# Patient Record
Sex: FEMALE | Race: OTHER | NOT HISPANIC OR LATINO
[De-identification: names, ages, dates, MRNs, and addresses within clinical notes are randomized per-mention and may not be internally consistent; named-entity substitution may affect disease eponyms.]

---

## 2019-01-24 PROBLEM — Z00.00 ENCOUNTER FOR PREVENTIVE HEALTH EXAMINATION: Status: ACTIVE | Noted: 2019-01-24

## 2019-01-30 ENCOUNTER — APPOINTMENT (OUTPATIENT)
Dept: GASTROENTEROLOGY | Facility: CLINIC | Age: 59
End: 2019-01-30
Payer: COMMERCIAL

## 2019-01-30 VITALS
RESPIRATION RATE: 16 BRPM | HEART RATE: 69 BPM | BODY MASS INDEX: 31.54 KG/M2 | SYSTOLIC BLOOD PRESSURE: 160 MMHG | HEIGHT: 63 IN | DIASTOLIC BLOOD PRESSURE: 97 MMHG | WEIGHT: 178 LBS

## 2019-01-30 DIAGNOSIS — K62.5 HEMORRHAGE OF ANUS AND RECTUM: ICD-10-CM

## 2019-01-30 PROCEDURE — 99204 OFFICE O/P NEW MOD 45 MIN: CPT

## 2019-01-30 PROCEDURE — 82272 OCCULT BLD FECES 1-3 TESTS: CPT

## 2019-01-30 NOTE — PHYSICAL EXAM
[General Appearance - Alert] : alert [General Appearance - In No Acute Distress] : in no acute distress [General Appearance - Well Nourished] : well nourished [General Appearance - Well Developed] : well developed [Sclera] : the sclera and conjunctiva were normal [Outer Ear] : the ears and nose were normal in appearance [Neck Appearance] : the appearance of the neck was normal [] : no respiratory distress [Respiration, Rhythm And Depth] : normal respiratory rhythm and effort [Exaggerated Use Of Accessory Muscles For Inspiration] : no accessory muscle use [Auscultation Breath Sounds / Voice Sounds] : lungs were clear to auscultation bilaterally [Apical Impulse] : the apical impulse was normal [Heart Rate And Rhythm] : heart rate was normal and rhythm regular [Heart Sounds] : normal S1 and S2 [Bowel Sounds] : normal bowel sounds [Abdomen Soft] : soft [Abdomen Tenderness] : non-tender [Normal Sphincter Tone] : normal sphincter tone [No Rectal Mass] : no rectal mass [Internal Hemorrhoid] : no internal hemorrhoids [External Hemorrhoid] : external hemorrhoids [Occult Blood Positive] : stool was negative for occult blood [FreeTextEntry1] : small  hemorrhoid [Skin Color & Pigmentation] : normal skin color and pigmentation [Oriented To Time, Place, And Person] : oriented to person, place, and time [Impaired Insight] : insight and judgment were intact [Affect] : the affect was normal

## 2019-01-30 NOTE — HISTORY OF PRESENT ILLNESS
[de-identified] : The patient is here for evaluation of rectal bleeding. The patient states the rectal bleeding is mild and only occurs with wiping. It occurs with bowel movement. She must place a  Tissue at the anus to stop the bleeding.  Bleeding lasts about 20 minutes. Last time this happened was 3 weeks ago. She has mild constipation for the past 10 years. It occurs every day. She is one hard stool a day. It is better with prune juice. She colonoscopy in 2011 which showed internal hemorrhoids. I reviewed the results

## 2019-01-30 NOTE — ASSESSMENT
[FreeTextEntry1] : A/P\par  -Pt with rectal bleeding, mild constipation, hemorrhoids\par - hemorrhoids- preparation  H prn\par -rectal  bleeding-I discussed the risks and benefits of colonoscopy and patient was given the opportunity to ask questions. Colonoscopy is being done to rule out colon cancer, polyps or AVMs. Patient is medically optimized for the procedure. Miralax prep\par - will get cbc,. cmp from primary MD

## 2019-03-20 ENCOUNTER — OUTPATIENT (OUTPATIENT)
Dept: OUTPATIENT SERVICES | Facility: HOSPITAL | Age: 59
LOS: 1 days | End: 2019-03-20
Payer: COMMERCIAL

## 2019-03-20 ENCOUNTER — APPOINTMENT (OUTPATIENT)
Dept: GASTROENTEROLOGY | Facility: GI CENTER | Age: 59
End: 2019-03-20
Payer: COMMERCIAL

## 2019-03-20 DIAGNOSIS — K62.5 HEMORRHAGE OF ANUS AND RECTUM: ICD-10-CM

## 2019-03-20 PROCEDURE — 45378 DIAGNOSTIC COLONOSCOPY: CPT

## 2019-03-20 RX ORDER — POLYETHYLENE GLYCOL 3350 17 G/17G
17 POWDER, FOR SOLUTION ORAL DAILY
Qty: 1530 | Refills: 1 | Status: ACTIVE | COMMUNITY
Start: 2019-03-20 | End: 1900-01-01

## 2019-03-20 RX ORDER — HYDROCORTISONE 2.5% 25 MG/G
2.5 CREAM TOPICAL TWICE DAILY
Qty: 1 | Refills: 5 | Status: ACTIVE | COMMUNITY
Start: 2019-03-20 | End: 1900-01-01

## 2019-03-20 NOTE — PHYSICAL EXAM
[General Appearance - Alert] : alert [General Appearance - In No Acute Distress] : in no acute distress [General Appearance - Well Nourished] : well nourished [Sclera] : the sclera and conjunctiva were normal [Outer Ear] : the ears and nose were normal in appearance [Neck Appearance] : the appearance of the neck was normal [Respiration, Rhythm And Depth] : normal respiratory rhythm and effort [Exaggerated Use Of Accessory Muscles For Inspiration] : no accessory muscle use [Auscultation Breath Sounds / Voice Sounds] : lungs were clear to auscultation bilaterally [Apical Impulse] : the apical impulse was normal [Heart Rate And Rhythm] : heart rate was normal and rhythm regular [Heart Sounds] : normal S1 and S2 [Bowel Sounds] : normal bowel sounds [Abdomen Soft] : soft [Abdomen Tenderness] : non-tender [Skin Color & Pigmentation] : normal skin color and pigmentation [Skin Turgor] : normal skin turgor [] : no rash [Oriented To Time, Place, And Person] : oriented to person, place, and time [Impaired Insight] : insight and judgment were intact [Affect] : the affect was normal

## 2019-03-20 NOTE — ASSESSMENT
[FreeTextEntry1] : A/P\par rectal bleeding, constipation\par colon showed hemorrhoids\par proctosol HC bid\par miralax 17 qd\par colonoscopy in 10 years\par F/u in 3 months

## 2019-03-20 NOTE — PROCEDURE
[Procedure Explained] : The procedure was explained [Allergies Reviewed] : allergies reviewed. [Risks] : Risks [Benefits] : benefits [Alternatives] : alternatives [Bleeding] : bleeding risk [Infection] : risk of infection [Consent Obtained] : written consent was obtained prior to the procedure and is detailed in the patient's record [Bowel Prep Kit] : the patient took the appropriate bowel preparation kit as directed [Patient] : the patient [Approved Diet Followed] : the patient avoided solid foods and adhered to the approved diet list for 24 hours prior to the procedure [Cardiac Monitor] : cardiac monitor [Automated Blood Pressure Cuff] : automated blood pressure cuff [Pulse Oximeter] : pulse oximeter [Sedation Clearance] : the patient was cleared for moderate sedation [Prep Qualtiy: ___] : Prep Quality:  [unfilled] [Withdrawal Time: ___] : Withdrawal Time:  [unfilled] [Performed By: ___] : Performed by:  BRIGITTE [Abnormal Rectum] : a normal rectum [Left Lateral Decubitus] : The patient was positioned in the left lateral decubitus position [External Hemorrhoids] : no external hemorrhoids [Cecum (Landmarks/Transillum)] : and guided to the cecum which was identified by the anatomic landmarks of the appendiceal orifice and ileocecal valve and by transillumination in the right lower quadrant [Insufflated] : insufflated [Single Pass Needed] : after a single pass [Retroflex View] : a retroflex view of the rectum was performed [Patient Rotated Into Alternating Positions] : the patient was not rotated [Hemorrhoids] : hemorrhoids [Normal] : Normal [No Complications] : There were no complications [Vital Signs Stable] : the vital signs were stable [Tolerated Well] : the patient tolerated the procedure well [de-identified] : rectal bleeding, constipation

## 2019-03-20 NOTE — REASON FOR VISIT
[Procedure: _________] : a [unfilled] procedure visit [FreeTextEntry1] : rectal bleeding, constipation [FreeTextEntry2] : rectal bleeding, constipation [Colonoscopy] : a colonoscopy

## 2019-03-20 NOTE — PROCEDURE
[Allergies Reviewed] : allergies reviewed. [Procedure Explained] : The procedure was explained [Risks] : Risks [Alternatives] : alternatives [Benefits] : benefits [Bleeding] : bleeding risk [Consent Obtained] : written consent was obtained prior to the procedure and is detailed in the patient's record [Infection] : risk of infection [Patient] : the patient [Bowel Prep Kit] : the patient took the appropriate bowel preparation kit as directed [Approved Diet Followed] : the patient avoided solid foods and adhered to the approved diet list for 24 hours prior to the procedure [Automated Blood Pressure Cuff] : automated blood pressure cuff [Cardiac Monitor] : cardiac monitor [Pulse Oximeter] : pulse oximeter [Sedation Clearance] : the patient was cleared for moderate sedation [Withdrawal Time: ___] : Withdrawal Time:  [unfilled] [Prep Qualtiy: ___] : Prep Quality:  [unfilled] [Performed By: ___] : Performed by:  BRIGITTE [Abnormal Rectum] : a normal rectum [Left Lateral Decubitus] : The patient was positioned in the left lateral decubitus position [Cecum (Landmarks/Transillum)] : and guided to the cecum which was identified by the anatomic landmarks of the appendiceal orifice and ileocecal valve and by transillumination in the right lower quadrant [External Hemorrhoids] : no external hemorrhoids [Single Pass Needed] : after a single pass [Insufflated] : insufflated [Retroflex View] : a retroflex view of the rectum was performed [Patient Rotated Into Alternating Positions] : the patient was not rotated [Normal] : Normal [Hemorrhoids] : hemorrhoids [Tolerated Well] : the patient tolerated the procedure well [No Complications] : There were no complications [Vital Signs Stable] : the vital signs were stable [de-identified] : rectal bleeding, constipation

## 2020-04-03 ENCOUNTER — EMERGENCY (EMERGENCY)
Facility: HOSPITAL | Age: 60
LOS: 1 days | Discharge: HOME | End: 2020-04-03
Attending: EMERGENCY MEDICINE
Payer: COMMERCIAL

## 2020-04-03 VITALS
TEMPERATURE: 99 F | DIASTOLIC BLOOD PRESSURE: 90 MMHG | SYSTOLIC BLOOD PRESSURE: 147 MMHG | RESPIRATION RATE: 18 BRPM | WEIGHT: 169.98 LBS | HEIGHT: 63 IN | OXYGEN SATURATION: 97 % | HEART RATE: 99 BPM

## 2020-04-03 LAB
ALBUMIN SERPL ELPH-MCNC: 4.3 G/DL — SIGNIFICANT CHANGE UP (ref 3.3–5.2)
ALP SERPL-CCNC: 80 U/L — SIGNIFICANT CHANGE UP (ref 40–120)
ALT FLD-CCNC: 19 U/L — SIGNIFICANT CHANGE UP
ANION GAP SERPL CALC-SCNC: 14 MMOL/L — SIGNIFICANT CHANGE UP (ref 5–17)
APPEARANCE UR: CLEAR — SIGNIFICANT CHANGE UP
AST SERPL-CCNC: 35 U/L — HIGH
BACTERIA # UR AUTO: NEGATIVE — SIGNIFICANT CHANGE UP
BASOPHILS # BLD AUTO: 0.01 K/UL — SIGNIFICANT CHANGE UP (ref 0–0.2)
BASOPHILS NFR BLD AUTO: 0.2 % — SIGNIFICANT CHANGE UP (ref 0–2)
BILIRUB SERPL-MCNC: 0.3 MG/DL — LOW (ref 0.4–2)
BILIRUB UR-MCNC: NEGATIVE — SIGNIFICANT CHANGE UP
BUN SERPL-MCNC: 14 MG/DL — SIGNIFICANT CHANGE UP (ref 8–20)
CALCIUM SERPL-MCNC: 9.6 MG/DL — SIGNIFICANT CHANGE UP (ref 8.6–10.2)
CHLORIDE SERPL-SCNC: 98 MMOL/L — SIGNIFICANT CHANGE UP (ref 98–107)
CO2 SERPL-SCNC: 26 MMOL/L — SIGNIFICANT CHANGE UP (ref 22–29)
COLOR SPEC: YELLOW — SIGNIFICANT CHANGE UP
CREAT SERPL-MCNC: 0.74 MG/DL — SIGNIFICANT CHANGE UP (ref 0.5–1.3)
DIFF PNL FLD: ABNORMAL
EOSINOPHIL # BLD AUTO: 0 K/UL — SIGNIFICANT CHANGE UP (ref 0–0.5)
EOSINOPHIL NFR BLD AUTO: 0 % — SIGNIFICANT CHANGE UP (ref 0–6)
EPI CELLS # UR: SIGNIFICANT CHANGE UP
GLUCOSE SERPL-MCNC: 114 MG/DL — HIGH (ref 70–99)
GLUCOSE UR QL: NEGATIVE MG/DL — SIGNIFICANT CHANGE UP
HCT VFR BLD CALC: 38.8 % — SIGNIFICANT CHANGE UP (ref 34.5–45)
HGB BLD-MCNC: 13.1 G/DL — SIGNIFICANT CHANGE UP (ref 11.5–15.5)
IMM GRANULOCYTES NFR BLD AUTO: 0.2 % — SIGNIFICANT CHANGE UP (ref 0–1.5)
KETONES UR-MCNC: NEGATIVE — SIGNIFICANT CHANGE UP
LEUKOCYTE ESTERASE UR-ACNC: NEGATIVE — SIGNIFICANT CHANGE UP
LIDOCAIN IGE QN: 65 U/L — HIGH (ref 22–51)
LYMPHOCYTES # BLD AUTO: 1.09 K/UL — SIGNIFICANT CHANGE UP (ref 1–3.3)
LYMPHOCYTES # BLD AUTO: 24.8 % — SIGNIFICANT CHANGE UP (ref 13–44)
MCHC RBC-ENTMCNC: 29.8 PG — SIGNIFICANT CHANGE UP (ref 27–34)
MCHC RBC-ENTMCNC: 33.8 GM/DL — SIGNIFICANT CHANGE UP (ref 32–36)
MCV RBC AUTO: 88.4 FL — SIGNIFICANT CHANGE UP (ref 80–100)
MONOCYTES # BLD AUTO: 0.6 K/UL — SIGNIFICANT CHANGE UP (ref 0–0.9)
MONOCYTES NFR BLD AUTO: 13.6 % — SIGNIFICANT CHANGE UP (ref 2–14)
NEUTROPHILS # BLD AUTO: 2.69 K/UL — SIGNIFICANT CHANGE UP (ref 1.8–7.4)
NEUTROPHILS NFR BLD AUTO: 61.2 % — SIGNIFICANT CHANGE UP (ref 43–77)
NITRITE UR-MCNC: NEGATIVE — SIGNIFICANT CHANGE UP
PH UR: 6 — SIGNIFICANT CHANGE UP (ref 5–8)
PLATELET # BLD AUTO: 222 K/UL — SIGNIFICANT CHANGE UP (ref 150–400)
POTASSIUM SERPL-MCNC: 5 MMOL/L — SIGNIFICANT CHANGE UP (ref 3.5–5.3)
POTASSIUM SERPL-SCNC: 5 MMOL/L — SIGNIFICANT CHANGE UP (ref 3.5–5.3)
PROT SERPL-MCNC: 8.1 G/DL — SIGNIFICANT CHANGE UP (ref 6.6–8.7)
PROT UR-MCNC: 30 MG/DL
RBC # BLD: 4.39 M/UL — SIGNIFICANT CHANGE UP (ref 3.8–5.2)
RBC # FLD: 13 % — SIGNIFICANT CHANGE UP (ref 10.3–14.5)
RBC CASTS # UR COMP ASSIST: SIGNIFICANT CHANGE UP /HPF (ref 0–4)
SODIUM SERPL-SCNC: 138 MMOL/L — SIGNIFICANT CHANGE UP (ref 135–145)
SP GR SPEC: 1.01 — SIGNIFICANT CHANGE UP (ref 1.01–1.02)
UROBILINOGEN FLD QL: NEGATIVE MG/DL — SIGNIFICANT CHANGE UP
WBC # BLD: 4.4 K/UL — SIGNIFICANT CHANGE UP (ref 3.8–10.5)
WBC # FLD AUTO: 4.4 K/UL — SIGNIFICANT CHANGE UP (ref 3.8–10.5)
WBC UR QL: NEGATIVE — SIGNIFICANT CHANGE UP

## 2020-04-03 PROCEDURE — 85027 COMPLETE CBC AUTOMATED: CPT

## 2020-04-03 PROCEDURE — 99284 EMERGENCY DEPT VISIT MOD MDM: CPT

## 2020-04-03 PROCEDURE — 87635 SARS-COV-2 COVID-19 AMP PRB: CPT

## 2020-04-03 PROCEDURE — 81001 URINALYSIS AUTO W/SCOPE: CPT

## 2020-04-03 PROCEDURE — 36415 COLL VENOUS BLD VENIPUNCTURE: CPT

## 2020-04-03 PROCEDURE — 96374 THER/PROPH/DIAG INJ IV PUSH: CPT

## 2020-04-03 PROCEDURE — 99284 EMERGENCY DEPT VISIT MOD MDM: CPT | Mod: 25

## 2020-04-03 PROCEDURE — 83690 ASSAY OF LIPASE: CPT

## 2020-04-03 PROCEDURE — 80053 COMPREHEN METABOLIC PANEL: CPT

## 2020-04-03 RX ORDER — SODIUM CHLORIDE 9 MG/ML
1000 INJECTION INTRAMUSCULAR; INTRAVENOUS; SUBCUTANEOUS ONCE
Refills: 0 | Status: COMPLETED | OUTPATIENT
Start: 2020-04-03 | End: 2020-04-03

## 2020-04-03 RX ORDER — FAMOTIDINE 10 MG/ML
20 INJECTION INTRAVENOUS ONCE
Refills: 0 | Status: COMPLETED | OUTPATIENT
Start: 2020-04-03 | End: 2020-04-03

## 2020-04-03 RX ORDER — SUCRALFATE 1 G
1 TABLET ORAL ONCE
Refills: 0 | Status: COMPLETED | OUTPATIENT
Start: 2020-04-03 | End: 2020-04-03

## 2020-04-03 RX ADMIN — SODIUM CHLORIDE 2000 MILLILITER(S): 9 INJECTION INTRAMUSCULAR; INTRAVENOUS; SUBCUTANEOUS at 14:27

## 2020-04-03 RX ADMIN — Medication 1 GRAM(S): at 14:26

## 2020-04-03 RX ADMIN — FAMOTIDINE 20 MILLIGRAM(S): 10 INJECTION INTRAVENOUS at 14:27

## 2020-04-03 NOTE — ED STATDOCS - PATIENT PORTAL LINK FT
You can access the FollowMyHealth Patient Portal offered by [facility] by registering at the following website: [email or web address]. By joining [employer]’s FollowMyHealth portal, you will also be able to view your health information using other applications (apps) compatible with our system.

## 2020-04-03 NOTE — ED STATDOCS - CLINICAL SUMMARY MEDICAL DECISION MAKING FREE TEXT BOX
abd pain with epigastric tenderness and rales in left lower lobe. Possible corona. check labs, Pepcid, Carafate and reassess.

## 2020-04-03 NOTE — ED STATDOCS - PROGRESS NOTE DETAILS
Kirit MELGOZA: Labs reviewed and discussed with pt.  On reevaluation, pt remains in no acute distress, abdomen soft and non-tender.  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  Pt advised to self-isolate until symptoms resolve.  Return to the ED for new or worsening symptoms.

## 2020-04-03 NOTE — ED STATDOCS - NSFOLLOWUPINSTRUCTIONS_ED_ALL_ED_FT
Take Tylenol as needed for symptoms.  Stay at home for 2 weeks, until your symptoms have completely resolved.  Stay hydrated.  Return to the emergency room for worsening symptoms, including increased difficulty breathing or chest pain.

## 2020-04-03 NOTE — ED STATDOCS - NS_ ATTENDINGSCRIBEDETAILS _ED_A_ED_FT
I, Rick Randolph, performed the initial face to face bedside interview with this patient regarding history of present illness, review of symptoms and relevant past medical, social and family history.  I completed an independent physical examination.  I was the provider who initially evaluated this patient.  The history, relevant review of systems, past medical and surgical history, medical decision making, and physical examination was documented by the scribe in my presence and I attest to the accuracy of the documentation. Follow-up on ordered tests (ie labs, radiologic studies) and re-evaluation of the patient's status has been communicated to the resident.  Disposition of the patient will be based on test outcome and response to ED interventions.

## 2020-04-03 NOTE — ED ADULT TRIAGE NOTE - CHIEF COMPLAINT QUOTE
pt c/o "Buzz shaky with my nerves", decreased po intake and abd pain, nausea and diarrhea, resp even and unlabored no distress noted, pt denies sob, chest pain vomiting

## 2020-04-03 NOTE — ED STATDOCS - OBJECTIVE STATEMENT
60y/o F with PMHx of Sickle Cell Anemia presents to the ED c/o abdominal pain which began a few weeks ago with assoc. sx of loss of appetite and nausea. Additionally c/o diarrhea which began today and worsening dizziness for 1 week. Pt had prior cough, since resolved. She additionally reports fever 1 week ago, resolved. Pt had prior episode of stomach growling sensation with increased gas. No known sick/covid contact. Denies loss of taste or smell, SOB or vomiting. 58y/o F with PMHx of Sickle Cell Anemia presents to the ED c/o abdominal pain which began a few weeks ago with assoc. sx of loss of appetite and nausea. Additionally c/o diarrhea which began today and worsening dizziness for 1 week. Pt had prior cough, since resolved. She additionally reports fever 1 week ago, resolved. Pt had prior episode of stomach growling sensation with increased gas. No known sick/covid contact. Denies loss of taste or smell, SOB or vomiting. H/O prior dyspepsia.  healthcare worker.

## 2020-04-03 NOTE — ED STATDOCS - PMH
No pertinent past medical history <<----- Click to add NO pertinent Past Medical History Sickle cell trait

## 2020-04-05 LAB — SARS-COV-2 RNA SPEC QL NAA+PROBE: (no result)

## 2020-05-01 ENCOUNTER — TRANSCRIPTION ENCOUNTER (OUTPATIENT)
Age: 60
End: 2020-05-01

## 2024-09-17 ENCOUNTER — RX ONLY (RX ONLY)
Age: 64
End: 2024-09-17

## 2024-09-17 ENCOUNTER — OFFICE (OUTPATIENT)
Dept: URBAN - METROPOLITAN AREA CLINIC 12 | Facility: CLINIC | Age: 64
Setting detail: OPHTHALMOLOGY
End: 2024-09-17
Payer: COMMERCIAL

## 2024-09-17 DIAGNOSIS — H00.14: ICD-10-CM

## 2024-09-17 PROCEDURE — 99203 OFFICE O/P NEW LOW 30 MIN: CPT | Performed by: OPTOMETRIST

## 2024-09-17 ASSESSMENT — CONFRONTATIONAL VISUAL FIELD TEST (CVF)
OD_FINDINGS: FULL
OS_FINDINGS: FULL

## 2024-10-02 ENCOUNTER — OFFICE (OUTPATIENT)
Dept: URBAN - METROPOLITAN AREA CLINIC 12 | Facility: CLINIC | Age: 64
Setting detail: OPHTHALMOLOGY
End: 2024-10-02
Payer: COMMERCIAL

## 2024-10-02 DIAGNOSIS — H16.223: ICD-10-CM

## 2024-10-02 DIAGNOSIS — H90.3: ICD-10-CM

## 2024-10-02 DIAGNOSIS — H11.153: ICD-10-CM

## 2024-10-02 PROBLEM — H00.14 CHALAZION; LEFT UPPER LID: Status: RESOLVED | Noted: 2024-09-17 | Resolved: 2024-10-02

## 2024-10-02 PROCEDURE — 92012 INTRM OPH EXAM EST PATIENT: CPT | Performed by: STUDENT IN AN ORGANIZED HEALTH CARE EDUCATION/TRAINING PROGRAM

## 2024-10-02 PROCEDURE — 92557 COMPREHENSIVE HEARING TEST: CPT

## 2024-10-02 ASSESSMENT — SUPERFICIAL PUNCTATE KERATITIS (SPK)
OS_SPK: 1+
OD_SPK: 1+

## 2024-10-02 ASSESSMENT — VISUAL ACUITY
OD_BCVA: 20/25+2
OS_BCVA: 20/25+2

## 2024-10-02 ASSESSMENT — KERATOMETRY
OD_K1POWER_DIOPTERS: 44.75
OD_K2POWER_DIOPTERS: 45.25
OS_K2POWER_DIOPTERS: 45.25
OS_AXISANGLE_DEGREES: 161
OS_K1POWER_DIOPTERS: 44.75
OD_AXISANGLE_DEGREES: 013
METHOD_AUTO_MANUAL: AUTO

## 2024-10-02 ASSESSMENT — REFRACTION_MANIFEST
OS_SPHERE: +0.50
OD_ADD: +2.25
OD_VA1: 20/20
OD_CYLINDER: -0.50
OD_SPHERE: +0.75
OS_CYLINDER: -1.00
OD_AXIS: 075
OS_AXIS: 090
OS_ADD: +2.25
OS_VA1: 20/20

## 2024-10-02 ASSESSMENT — REFRACTION_AUTOREFRACTION
OS_CYLINDER: -1.25
OD_SPHERE: +1.75
OD_CYLINDER: -1.25
OS_AXIS: 081
OD_AXIS: 100
OS_SPHERE: +1.25

## 2024-10-02 ASSESSMENT — CONFRONTATIONAL VISUAL FIELD TEST (CVF)
OS_FINDINGS: FULL
OD_FINDINGS: FULL

## 2024-10-02 ASSESSMENT — TONOMETRY
OS_IOP_MMHG: 18
OD_IOP_MMHG: 20

## 2024-10-28 ENCOUNTER — OFFICE (OUTPATIENT)
Dept: URBAN - METROPOLITAN AREA CLINIC 12 | Facility: CLINIC | Age: 64
Setting detail: OPHTHALMOLOGY
End: 2024-10-28
Payer: COMMERCIAL

## 2024-10-28 DIAGNOSIS — H16.223: ICD-10-CM

## 2024-10-28 DIAGNOSIS — H11.153: ICD-10-CM

## 2024-10-28 PROBLEM — H35.033 HYPERTENSIVE RETINOPATHY; BOTH EYES: Status: ACTIVE | Noted: 2024-10-02

## 2024-10-28 PROCEDURE — 92012 INTRM OPH EXAM EST PATIENT: CPT | Performed by: OPHTHALMOLOGY

## 2024-10-28 ASSESSMENT — REFRACTION_MANIFEST
OS_CYLINDER: -1.00
OS_AXIS: 090
OD_CYLINDER: -0.50
OD_ADD: +2.25
OS_VA1: 20/20
OS_ADD: +2.25
OD_VA1: 20/20
OD_AXIS: 075
OD_SPHERE: +0.75
OS_SPHERE: +0.50

## 2024-10-28 ASSESSMENT — VISUAL ACUITY
OD_BCVA: 20/25-1
OS_BCVA: 20/25-

## 2024-10-28 ASSESSMENT — SUPERFICIAL PUNCTATE KERATITIS (SPK)
OS_SPK: 1+
OD_SPK: 1+

## 2024-10-28 ASSESSMENT — KERATOMETRY: METHOD_AUTO_MANUAL: AUTO

## 2024-10-28 ASSESSMENT — CONFRONTATIONAL VISUAL FIELD TEST (CVF)
OD_FINDINGS: FULL
OS_FINDINGS: FULL

## 2025-03-05 ENCOUNTER — NON-APPOINTMENT (OUTPATIENT)
Age: 65
End: 2025-03-05

## 2025-03-05 ENCOUNTER — APPOINTMENT (OUTPATIENT)
Dept: DERMATOLOGY | Facility: CLINIC | Age: 65
End: 2025-03-05
Payer: COMMERCIAL

## 2025-03-05 DIAGNOSIS — L30.8 OTHER SPECIFIED DERMATITIS: ICD-10-CM

## 2025-03-05 PROCEDURE — 99203 OFFICE O/P NEW LOW 30 MIN: CPT

## 2025-03-05 RX ORDER — HYDROCORTISONE 25 MG/G
2.5 OINTMENT TOPICAL
Qty: 1 | Refills: 1 | Status: ACTIVE | COMMUNITY
Start: 2025-03-05 | End: 1900-01-01

## 2025-03-05 RX ORDER — CHROMIUM 200 MCG
TABLET ORAL
Refills: 0 | Status: ACTIVE | COMMUNITY

## 2025-03-05 RX ORDER — MOMETASONE FUROATE 1 MG/G
0.1 OINTMENT TOPICAL
Qty: 1 | Refills: 1 | Status: ACTIVE | COMMUNITY
Start: 2025-03-05 | End: 1900-01-01